# Patient Record
Sex: MALE | Race: BLACK OR AFRICAN AMERICAN | HISPANIC OR LATINO | Employment: FULL TIME | URBAN - METROPOLITAN AREA
[De-identification: names, ages, dates, MRNs, and addresses within clinical notes are randomized per-mention and may not be internally consistent; named-entity substitution may affect disease eponyms.]

---

## 2023-03-23 PROCEDURE — 82962 GLUCOSE BLOOD TEST: CPT

## 2024-10-28 ENCOUNTER — HOSPITAL ENCOUNTER (OUTPATIENT)
Facility: HOSPITAL | Age: 64
Setting detail: OBSERVATION
Discharge: HOME | End: 2024-10-30
Attending: EMERGENCY MEDICINE

## 2024-10-28 ENCOUNTER — APPOINTMENT (OUTPATIENT)
Dept: GENERAL RADIOLOGY | Facility: HOSPITAL | Age: 64
End: 2024-10-28

## 2024-10-28 DIAGNOSIS — R53.83 OTHER FATIGUE: ICD-10-CM

## 2024-10-28 DIAGNOSIS — R73.9 HYPERGLYCEMIA: Primary | ICD-10-CM

## 2024-10-28 PROBLEM — R63.4 WEIGHT LOSS: Status: ACTIVE | Noted: 2024-10-28

## 2024-10-28 PROBLEM — E11.00 HYPEROSMOLAR HYPERGLYCEMIC STATE (HHS): Status: ACTIVE | Noted: 2024-10-28

## 2024-10-28 PROBLEM — R05.1 ACUTE COUGH: Status: ACTIVE | Noted: 2024-10-28

## 2024-10-28 LAB
ACETONE BLD QL: NEGATIVE
ALBUMIN SERPL-MCNC: 3.9 G/DL (ref 3.5–5.2)
ALBUMIN/GLOB SERPL: 1.1 G/DL
ALP SERPL-CCNC: 120 U/L (ref 39–117)
ALT SERPL W P-5'-P-CCNC: 19 U/L (ref 1–41)
ANION GAP SERPL CALCULATED.3IONS-SCNC: 11.2 MMOL/L (ref 5–15)
ANION GAP SERPL CALCULATED.3IONS-SCNC: 11.3 MMOL/L (ref 5–15)
AST SERPL-CCNC: 18 U/L (ref 1–40)
ATMOSPHERIC PRESS: ABNORMAL MM[HG]
BASE EXCESS BLDV CALC-SCNC: 1.7 MMOL/L (ref -2–2)
BASOPHILS # BLD AUTO: 0.03 10*3/MM3 (ref 0–0.2)
BASOPHILS # BLD AUTO: 0.04 10*3/MM3 (ref 0–0.2)
BASOPHILS NFR BLD AUTO: 0.5 % (ref 0–1.5)
BASOPHILS NFR BLD AUTO: 0.7 % (ref 0–1.5)
BDY SITE: ABNORMAL
BILIRUB SERPL-MCNC: 0.8 MG/DL (ref 0–1.2)
BILIRUB UR QL STRIP: NEGATIVE
BUN SERPL-MCNC: 31 MG/DL (ref 8–23)
BUN SERPL-MCNC: 31 MG/DL (ref 8–23)
BUN/CREAT SERPL: 20 (ref 7–25)
BUN/CREAT SERPL: 21.1 (ref 7–25)
CALCIUM SPEC-SCNC: 9.4 MG/DL (ref 8.6–10.5)
CALCIUM SPEC-SCNC: 9.5 MG/DL (ref 8.6–10.5)
CHLORIDE SERPL-SCNC: 86 MMOL/L (ref 98–107)
CHLORIDE SERPL-SCNC: 88 MMOL/L (ref 98–107)
CHOLEST SERPL-MCNC: 191 MG/DL (ref 0–200)
CLARITY UR: CLEAR
CO2 BLDA-SCNC: 28.7 MMOL/L (ref 22–29)
CO2 SERPL-SCNC: 24.7 MMOL/L (ref 22–29)
CO2 SERPL-SCNC: 24.8 MMOL/L (ref 22–29)
COLOR UR: YELLOW
CREAT SERPL-MCNC: 1.47 MG/DL (ref 0.76–1.27)
CREAT SERPL-MCNC: 1.55 MG/DL (ref 0.76–1.27)
DEPRECATED RDW RBC AUTO: 39.3 FL (ref 37–54)
DEPRECATED RDW RBC AUTO: 39.7 FL (ref 37–54)
EGFRCR SERPLBLD CKD-EPI 2021: 50 ML/MIN/1.73
EGFRCR SERPLBLD CKD-EPI 2021: 53.3 ML/MIN/1.73
EOSINOPHIL # BLD AUTO: 0.07 10*3/MM3 (ref 0–0.4)
EOSINOPHIL # BLD AUTO: 0.09 10*3/MM3 (ref 0–0.4)
EOSINOPHIL NFR BLD AUTO: 1.2 % (ref 0.3–6.2)
EOSINOPHIL NFR BLD AUTO: 1.5 % (ref 0.3–6.2)
ERYTHROCYTE [DISTWIDTH] IN BLOOD BY AUTOMATED COUNT: 12.2 % (ref 12.3–15.4)
ERYTHROCYTE [DISTWIDTH] IN BLOOD BY AUTOMATED COUNT: 12.2 % (ref 12.3–15.4)
GEN 5 2HR TROPONIN T REFLEX: 9 NG/L
GLOBULIN UR ELPH-MCNC: 3.4 GM/DL
GLUCOSE BLDC GLUCOMTR-MCNC: 194 MG/DL (ref 70–105)
GLUCOSE BLDC GLUCOMTR-MCNC: 312 MG/DL (ref 70–105)
GLUCOSE BLDC GLUCOMTR-MCNC: 332 MG/DL (ref 70–105)
GLUCOSE BLDC GLUCOMTR-MCNC: 534 MG/DL (ref 70–105)
GLUCOSE BLDC GLUCOMTR-MCNC: >600 MG/DL (ref 70–105)
GLUCOSE SERPL-MCNC: 734 MG/DL (ref 65–99)
GLUCOSE SERPL-MCNC: 785 MG/DL (ref 65–99)
GLUCOSE UR STRIP-MCNC: ABNORMAL MG/DL
HBA1C MFR BLD: 19.3 % (ref 4.8–5.6)
HCO3 BLDV-SCNC: 27.3 MMOL/L (ref 22–26)
HCT VFR BLD AUTO: 40.8 % (ref 37.5–51)
HCT VFR BLD AUTO: 40.9 % (ref 37.5–51)
HDLC SERPL-MCNC: 41 MG/DL (ref 40–60)
HGB BLD-MCNC: 13.9 G/DL (ref 13–17.7)
HGB BLD-MCNC: 14.1 G/DL (ref 13–17.7)
HGB UR QL STRIP.AUTO: NEGATIVE
IMM GRANULOCYTES # BLD AUTO: 0.01 10*3/MM3 (ref 0–0.05)
IMM GRANULOCYTES # BLD AUTO: 0.02 10*3/MM3 (ref 0–0.05)
IMM GRANULOCYTES NFR BLD AUTO: 0.2 % (ref 0–0.5)
IMM GRANULOCYTES NFR BLD AUTO: 0.3 % (ref 0–0.5)
INHALED O2 CONCENTRATION: 21 %
KETONES UR QL STRIP: NEGATIVE
LDLC SERPL CALC-MCNC: 74 MG/DL (ref 0–100)
LDLC/HDLC SERPL: 1.3 {RATIO}
LEUKOCYTE ESTERASE UR QL STRIP.AUTO: NEGATIVE
LIPASE SERPL-CCNC: 47 U/L (ref 13–60)
LYMPHOCYTES # BLD AUTO: 1.12 10*3/MM3 (ref 0.7–3.1)
LYMPHOCYTES # BLD AUTO: 1.25 10*3/MM3 (ref 0.7–3.1)
LYMPHOCYTES NFR BLD AUTO: 19.1 % (ref 19.6–45.3)
LYMPHOCYTES NFR BLD AUTO: 20.8 % (ref 19.6–45.3)
MAGNESIUM SERPL-MCNC: 1.9 MG/DL (ref 1.6–2.4)
MAGNESIUM SERPL-MCNC: 2.1 MG/DL (ref 1.6–2.4)
MCH RBC QN AUTO: 30.3 PG (ref 26.6–33)
MCH RBC QN AUTO: 30.3 PG (ref 26.6–33)
MCHC RBC AUTO-ENTMCNC: 34.1 G/DL (ref 31.5–35.7)
MCHC RBC AUTO-ENTMCNC: 34.5 G/DL (ref 31.5–35.7)
MCV RBC AUTO: 87.8 FL (ref 79–97)
MCV RBC AUTO: 88.9 FL (ref 79–97)
MODALITY: ABNORMAL
MONOCYTES # BLD AUTO: 0.37 10*3/MM3 (ref 0.1–0.9)
MONOCYTES # BLD AUTO: 0.45 10*3/MM3 (ref 0.1–0.9)
MONOCYTES NFR BLD AUTO: 6.3 % (ref 5–12)
MONOCYTES NFR BLD AUTO: 7.5 % (ref 5–12)
NEUTROPHILS NFR BLD AUTO: 4.17 10*3/MM3 (ref 1.7–7)
NEUTROPHILS NFR BLD AUTO: 4.26 10*3/MM3 (ref 1.7–7)
NEUTROPHILS NFR BLD AUTO: 69.4 % (ref 42.7–76)
NEUTROPHILS NFR BLD AUTO: 72.5 % (ref 42.7–76)
NITRITE UR QL STRIP: NEGATIVE
NRBC BLD AUTO-RTO: 0 /100 WBC (ref 0–0.2)
NRBC BLD AUTO-RTO: 0 /100 WBC (ref 0–0.2)
OSMOLALITY SERPL: 315 MOSM/KG (ref 280–301)
OSMOLALITY UR: 558 MOSM/KG (ref 300–800)
PCO2 BLDV: 45 MM HG (ref 42–51)
PH BLDV: 7.39 PH UNITS (ref 7.32–7.43)
PH UR STRIP.AUTO: 5.5 [PH] (ref 5–8)
PHOSPHATE SERPL-MCNC: 3.7 MG/DL (ref 2.5–4.5)
PHOSPHATE SERPL-MCNC: 4 MG/DL (ref 2.5–4.5)
PLATELET # BLD AUTO: 196 10*3/MM3 (ref 140–450)
PLATELET # BLD AUTO: 197 10*3/MM3 (ref 140–450)
PMV BLD AUTO: 10.8 FL (ref 6–12)
PMV BLD AUTO: 11.1 FL (ref 6–12)
PO2 BLDV: 42 MM HG (ref 40–42)
POTASSIUM SERPL-SCNC: 4.9 MMOL/L (ref 3.5–5.2)
POTASSIUM SERPL-SCNC: 5.1 MMOL/L (ref 3.5–5.2)
PROT SERPL-MCNC: 7.3 G/DL (ref 6–8.5)
PROT UR QL STRIP: NEGATIVE
RBC # BLD AUTO: 4.59 10*6/MM3 (ref 4.14–5.8)
RBC # BLD AUTO: 4.66 10*6/MM3 (ref 4.14–5.8)
SAO2 % BLDCOV: 76.6 % (ref 45–75)
SODIUM SERPL-SCNC: 122 MMOL/L (ref 136–145)
SODIUM SERPL-SCNC: 124 MMOL/L (ref 136–145)
SODIUM UR-SCNC: 29 MMOL/L
SP GR UR STRIP: 1.03 (ref 1–1.03)
TRIGL SERPL-MCNC: 484 MG/DL (ref 0–150)
TROPONIN T DELTA: 1 NG/L
TROPONIN T SERPL HS-MCNC: 8 NG/L
TSH SERPL DL<=0.05 MIU/L-ACNC: 1.79 UIU/ML (ref 0.27–4.2)
UROBILINOGEN UR QL STRIP: ABNORMAL
VLDLC SERPL-MCNC: 76 MG/DL (ref 5–40)
WBC NRBC COR # BLD AUTO: 5.87 10*3/MM3 (ref 3.4–10.8)
WBC NRBC COR # BLD AUTO: 6.01 10*3/MM3 (ref 3.4–10.8)

## 2024-10-28 PROCEDURE — G0378 HOSPITAL OBSERVATION PER HR: HCPCS

## 2024-10-28 PROCEDURE — 83930 ASSAY OF BLOOD OSMOLALITY: CPT | Performed by: EMERGENCY MEDICINE

## 2024-10-28 PROCEDURE — 82803 BLOOD GASES ANY COMBINATION: CPT | Performed by: EMERGENCY MEDICINE

## 2024-10-28 PROCEDURE — 96366 THER/PROPH/DIAG IV INF ADDON: CPT

## 2024-10-28 PROCEDURE — 25010000002 SODIUM CHLORIDE 0.9 % WITH KCL 20 MEQ 20-0.9 MEQ/L-% SOLUTION: Performed by: EMERGENCY MEDICINE

## 2024-10-28 PROCEDURE — 96375 TX/PRO/DX INJ NEW DRUG ADDON: CPT

## 2024-10-28 PROCEDURE — 80053 COMPREHEN METABOLIC PANEL: CPT | Performed by: EMERGENCY MEDICINE

## 2024-10-28 PROCEDURE — 93005 ELECTROCARDIOGRAM TRACING: CPT | Performed by: EMERGENCY MEDICINE

## 2024-10-28 PROCEDURE — 83935 ASSAY OF URINE OSMOLALITY: CPT | Performed by: NURSE PRACTITIONER

## 2024-10-28 PROCEDURE — 81003 URINALYSIS AUTO W/O SCOPE: CPT | Performed by: EMERGENCY MEDICINE

## 2024-10-28 PROCEDURE — 99285 EMERGENCY DEPT VISIT HI MDM: CPT

## 2024-10-28 PROCEDURE — 82570 ASSAY OF URINE CREATININE: CPT | Performed by: NURSE PRACTITIONER

## 2024-10-28 PROCEDURE — 25810000003 SODIUM CHLORIDE 0.9 % SOLUTION: Performed by: EMERGENCY MEDICINE

## 2024-10-28 PROCEDURE — 82948 REAGENT STRIP/BLOOD GLUCOSE: CPT

## 2024-10-28 PROCEDURE — 82009 KETONE BODYS QUAL: CPT | Performed by: EMERGENCY MEDICINE

## 2024-10-28 PROCEDURE — 96365 THER/PROPH/DIAG IV INF INIT: CPT

## 2024-10-28 PROCEDURE — 84484 ASSAY OF TROPONIN QUANT: CPT | Performed by: EMERGENCY MEDICINE

## 2024-10-28 PROCEDURE — 85025 COMPLETE CBC W/AUTO DIFF WBC: CPT | Performed by: EMERGENCY MEDICINE

## 2024-10-28 PROCEDURE — 71045 X-RAY EXAM CHEST 1 VIEW: CPT

## 2024-10-28 PROCEDURE — 84300 ASSAY OF URINE SODIUM: CPT | Performed by: NURSE PRACTITIONER

## 2024-10-28 PROCEDURE — 84100 ASSAY OF PHOSPHORUS: CPT | Performed by: EMERGENCY MEDICINE

## 2024-10-28 PROCEDURE — 83690 ASSAY OF LIPASE: CPT | Performed by: EMERGENCY MEDICINE

## 2024-10-28 PROCEDURE — 84540 ASSAY OF URINE/UREA-N: CPT | Performed by: NURSE PRACTITIONER

## 2024-10-28 PROCEDURE — 93005 ELECTROCARDIOGRAM TRACING: CPT

## 2024-10-28 PROCEDURE — 83036 HEMOGLOBIN GLYCOSYLATED A1C: CPT | Performed by: EMERGENCY MEDICINE

## 2024-10-28 PROCEDURE — 84443 ASSAY THYROID STIM HORMONE: CPT | Performed by: EMERGENCY MEDICINE

## 2024-10-28 PROCEDURE — 80061 LIPID PANEL: CPT | Performed by: NURSE PRACTITIONER

## 2024-10-28 PROCEDURE — 83735 ASSAY OF MAGNESIUM: CPT | Performed by: EMERGENCY MEDICINE

## 2024-10-28 RX ORDER — ENOXAPARIN SODIUM 100 MG/ML
40 INJECTION SUBCUTANEOUS
Status: DISCONTINUED | OUTPATIENT
Start: 2024-10-28 | End: 2024-10-30 | Stop reason: HOSPADM

## 2024-10-28 RX ORDER — NICOTINE POLACRILEX 4 MG
15 LOZENGE BUCCAL
Status: DISCONTINUED | OUTPATIENT
Start: 2024-10-28 | End: 2024-10-29 | Stop reason: SDUPTHER

## 2024-10-28 RX ORDER — SODIUM CHLORIDE 9 MG/ML
200 INJECTION, SOLUTION INTRAVENOUS CONTINUOUS PRN
Status: DISCONTINUED | OUTPATIENT
Start: 2024-10-28 | End: 2024-10-29

## 2024-10-28 RX ORDER — NITROGLYCERIN 0.4 MG/1
0.4 TABLET SUBLINGUAL
Status: DISCONTINUED | OUTPATIENT
Start: 2024-10-28 | End: 2024-10-30 | Stop reason: HOSPADM

## 2024-10-28 RX ORDER — SODIUM CHLORIDE AND POTASSIUM CHLORIDE 150; 450 MG/100ML; MG/100ML
200 INJECTION, SOLUTION INTRAVENOUS CONTINUOUS PRN
Status: DISCONTINUED | OUTPATIENT
Start: 2024-10-28 | End: 2024-10-29

## 2024-10-28 RX ORDER — SODIUM CHLORIDE 0.9 % (FLUSH) 0.9 %
10 SYRINGE (ML) INJECTION AS NEEDED
Status: DISCONTINUED | OUTPATIENT
Start: 2024-10-28 | End: 2024-10-30 | Stop reason: HOSPADM

## 2024-10-28 RX ORDER — DEXTROSE MONOHYDRATE AND SODIUM CHLORIDE 5; .45 G/100ML; G/100ML
125 INJECTION, SOLUTION INTRAVENOUS CONTINUOUS PRN
Status: DISCONTINUED | OUTPATIENT
Start: 2024-10-28 | End: 2024-10-29

## 2024-10-28 RX ORDER — BISACODYL 5 MG/1
5 TABLET, DELAYED RELEASE ORAL DAILY PRN
Status: DISCONTINUED | OUTPATIENT
Start: 2024-10-28 | End: 2024-10-30 | Stop reason: HOSPADM

## 2024-10-28 RX ORDER — POLYETHYLENE GLYCOL 3350 17 G/17G
17 POWDER, FOR SOLUTION ORAL DAILY PRN
Status: DISCONTINUED | OUTPATIENT
Start: 2024-10-28 | End: 2024-10-30 | Stop reason: HOSPADM

## 2024-10-28 RX ORDER — SODIUM CHLORIDE AND POTASSIUM CHLORIDE 150; 900 MG/100ML; MG/100ML
200 INJECTION, SOLUTION INTRAVENOUS CONTINUOUS PRN
Status: DISCONTINUED | OUTPATIENT
Start: 2024-10-28 | End: 2024-10-29

## 2024-10-28 RX ORDER — ACETAMINOPHEN 325 MG/1
650 TABLET ORAL EVERY 4 HOURS PRN
Status: DISCONTINUED | OUTPATIENT
Start: 2024-10-28 | End: 2024-10-30 | Stop reason: HOSPADM

## 2024-10-28 RX ORDER — DEXTROSE MONOHYDRATE AND SODIUM CHLORIDE 5; .9 G/100ML; G/100ML
125 INJECTION, SOLUTION INTRAVENOUS CONTINUOUS PRN
Status: DISCONTINUED | OUTPATIENT
Start: 2024-10-28 | End: 2024-10-29

## 2024-10-28 RX ORDER — DEXTROSE MONOHYDRATE, SODIUM CHLORIDE, AND POTASSIUM CHLORIDE 50; 1.49; 4.5 G/1000ML; G/1000ML; G/1000ML
125 INJECTION, SOLUTION INTRAVENOUS CONTINUOUS PRN
Status: DISCONTINUED | OUTPATIENT
Start: 2024-10-28 | End: 2024-10-29

## 2024-10-28 RX ORDER — SODIUM CHLORIDE 0.9 % (FLUSH) 0.9 %
10 SYRINGE (ML) INJECTION EVERY 12 HOURS SCHEDULED
Status: DISCONTINUED | OUTPATIENT
Start: 2024-10-28 | End: 2024-10-30 | Stop reason: HOSPADM

## 2024-10-28 RX ORDER — SODIUM CHLORIDE 9 MG/ML
40 INJECTION, SOLUTION INTRAVENOUS ONCE
Status: COMPLETED | OUTPATIENT
Start: 2024-10-28 | End: 2024-10-28

## 2024-10-28 RX ORDER — IBUPROFEN 600 MG/1
1 TABLET ORAL
Status: DISCONTINUED | OUTPATIENT
Start: 2024-10-28 | End: 2024-10-30 | Stop reason: HOSPADM

## 2024-10-28 RX ORDER — DEXTROSE MONOHYDRATE 25 G/50ML
10-50 INJECTION, SOLUTION INTRAVENOUS
Status: DISCONTINUED | OUTPATIENT
Start: 2024-10-28 | End: 2024-10-29 | Stop reason: SDUPTHER

## 2024-10-28 RX ORDER — DEXTROSE MONOHYDRATE, SODIUM CHLORIDE, AND POTASSIUM CHLORIDE 50; 2.98; 4.5 G/1000ML; G/1000ML; G/1000ML
125 INJECTION, SOLUTION INTRAVENOUS CONTINUOUS PRN
Status: DISCONTINUED | OUTPATIENT
Start: 2024-10-28 | End: 2024-10-29

## 2024-10-28 RX ORDER — UBIDECARENONE 100 MG
100 CAPSULE ORAL DAILY
COMMUNITY

## 2024-10-28 RX ORDER — ONDANSETRON 2 MG/ML
4 INJECTION INTRAMUSCULAR; INTRAVENOUS EVERY 6 HOURS PRN
Status: DISCONTINUED | OUTPATIENT
Start: 2024-10-28 | End: 2024-10-30 | Stop reason: HOSPADM

## 2024-10-28 RX ORDER — SODIUM CHLORIDE 450 MG/100ML
200 INJECTION, SOLUTION INTRAVENOUS CONTINUOUS PRN
Status: DISCONTINUED | OUTPATIENT
Start: 2024-10-28 | End: 2024-10-29

## 2024-10-28 RX ORDER — MULTIPLE VITAMINS W/ MINERALS TAB 9MG-400MCG
1 TAB ORAL DAILY
COMMUNITY

## 2024-10-28 RX ORDER — DEXTROSE MONOHYDRATE, SODIUM CHLORIDE, AND POTASSIUM CHLORIDE 50; 1.49; 9 G/1000ML; G/1000ML; G/1000ML
125 INJECTION, SOLUTION INTRAVENOUS CONTINUOUS PRN
Status: DISCONTINUED | OUTPATIENT
Start: 2024-10-28 | End: 2024-10-29

## 2024-10-28 RX ORDER — GUAIFENESIN 600 MG/1
600 TABLET, EXTENDED RELEASE ORAL EVERY 12 HOURS SCHEDULED
Status: DISCONTINUED | OUTPATIENT
Start: 2024-10-28 | End: 2024-10-30 | Stop reason: HOSPADM

## 2024-10-28 RX ORDER — PANTOPRAZOLE SODIUM 40 MG/10ML
40 INJECTION, POWDER, LYOPHILIZED, FOR SOLUTION INTRAVENOUS
Status: DISCONTINUED | OUTPATIENT
Start: 2024-10-28 | End: 2024-10-29

## 2024-10-28 RX ORDER — BISACODYL 10 MG
10 SUPPOSITORY, RECTAL RECTAL DAILY PRN
Status: DISCONTINUED | OUTPATIENT
Start: 2024-10-28 | End: 2024-10-30 | Stop reason: HOSPADM

## 2024-10-28 RX ORDER — SODIUM CHLORIDE AND POTASSIUM CHLORIDE 300; 900 MG/100ML; MG/100ML
200 INJECTION, SOLUTION INTRAVENOUS CONTINUOUS PRN
Status: DISCONTINUED | OUTPATIENT
Start: 2024-10-28 | End: 2024-10-29

## 2024-10-28 RX ORDER — AMOXICILLIN 250 MG
2 CAPSULE ORAL 2 TIMES DAILY PRN
Status: DISCONTINUED | OUTPATIENT
Start: 2024-10-28 | End: 2024-10-30 | Stop reason: HOSPADM

## 2024-10-28 RX ORDER — DEXTROSE MONOHYDRATE, SODIUM CHLORIDE, AND POTASSIUM CHLORIDE 50; 2.98; 9 G/1000ML; G/1000ML; G/1000ML
125 INJECTION, SOLUTION INTRAVENOUS CONTINUOUS PRN
Status: DISCONTINUED | OUTPATIENT
Start: 2024-10-28 | End: 2024-10-29

## 2024-10-28 RX ADMIN — SODIUM CHLORIDE 1000 ML/HR: 9 INJECTION, SOLUTION INTRAVENOUS at 20:13

## 2024-10-28 RX ADMIN — Medication 10 ML: at 21:17

## 2024-10-28 RX ADMIN — PANTOPRAZOLE SODIUM 40 MG: 40 INJECTION, POWDER, FOR SOLUTION INTRAVENOUS at 21:42

## 2024-10-28 RX ADMIN — GUAIFENESIN 600 MG: 600 TABLET, EXTENDED RELEASE ORAL at 22:39

## 2024-10-28 RX ADMIN — INSULIN HUMAN 10.8 UNITS/HR: 1 INJECTION, SOLUTION INTRAVENOUS at 20:08

## 2024-10-28 RX ADMIN — SODIUM CHLORIDE 40 ML: 9 INJECTION, SOLUTION INTRAVENOUS at 22:06

## 2024-10-28 RX ADMIN — SODIUM CHLORIDE AND POTASSIUM CHLORIDE 200 ML/HR: .9; .15 SOLUTION INTRAVENOUS at 22:21

## 2024-10-28 RX ADMIN — SODIUM CHLORIDE 1000 ML: 9 INJECTION, SOLUTION INTRAVENOUS at 20:05

## 2024-10-28 NOTE — ED PROVIDER NOTES
"Subjective   History of Present Illness  63-year-old male states he went to the urgent care center today because he is been feeling somewhat weak and fatigued over the last couple of months.  He states he is also had some weight loss.  He states that they checked his blood sugar and it was very high.  He has no history of diabetes or other health problems.  He states diabetes does run in the family.  He reports no vomiting or diarrhea.  He states he has had some increased urinary frequency without dysuria and has had increased thirst.  Review of Systems    Past Medical History:   Diagnosis Date    Acute cough 10/28/2024    Weight loss 10/28/2024       No Known Allergies    No past surgical history on file.    No family history on file.    Social History     Socioeconomic History    Marital status: Single   Tobacco Use    Smoking status: Never    Smokeless tobacco: Never   Vaping Use    Vaping status: Never Used   Substance and Sexual Activity    Alcohol use: Yes     Comment: rarely    Drug use: Never    Sexual activity: Defer     Prior to Admission medications    Medication Sig Start Date End Date Taking? Authorizing Provider   doxycycline (VIBRAMYCIN) 100 MG capsule Take 1 capsule by mouth 2 (Two) Times a Day. 3/23/23 10/28/24  Han Solis MD     /81   Pulse 99   Temp 98 °F (36.7 °C) (Oral)   Resp 19   Ht 190.5 cm (75\")   Wt 83.9 kg (185 lb)   SpO2 96%   BMI 23.12 kg/m²         Objective   Physical Exam  General: Well-developed well-appearing, no acute distress, alert and appropriate  Eyes:  sclera nonicteric  HEENT: Mucous membranes somewhat dry, no mucosal swelling  Neck: Supple, no nuchal rigidity, no lymphadenopathy  Respirations: Respirations nonlabored, equal breath sounds bilaterally, clear lungs  Heart regular rate and rhythm, no murmurs rubs or gallops,   Abdomen soft nontender nondistended, no hepatosplenomegaly, no hernia, no mass, normal bowel sounds, no CVA " tenderness  Extremities no clubbing cyanosis or edema, calves are symmetric and nontender  Neuro cranial nerves grossly intact, no focal limb deficits  Psych oriented, pleasant affect  Skin no rash, brisk cap refill  Procedures           ED Course  Results for orders placed or performed during the hospital encounter of 10/28/24   ECG 12 Lead Dyspnea    Collection Time: 10/28/24  5:41 PM   Result Value Ref Range    QT Interval 345 ms    QTC Interval 434 ms   Comprehensive Metabolic Panel    Collection Time: 10/28/24  6:12 PM    Specimen: Blood   Result Value Ref Range    Glucose 785 (C) 65 - 99 mg/dL    BUN 31 (H) 8 - 23 mg/dL    Creatinine 1.55 (H) 0.76 - 1.27 mg/dL    Sodium 122 (L) 136 - 145 mmol/L    Potassium 5.1 3.5 - 5.2 mmol/L    Chloride 86 (L) 98 - 107 mmol/L    CO2 24.7 22.0 - 29.0 mmol/L    Calcium 9.5 8.6 - 10.5 mg/dL    Total Protein 7.3 6.0 - 8.5 g/dL    Albumin 3.9 3.5 - 5.2 g/dL    ALT (SGPT) 19 1 - 41 U/L    AST (SGOT) 18 1 - 40 U/L    Alkaline Phosphatase 120 (H) 39 - 117 U/L    Total Bilirubin 0.8 0.0 - 1.2 mg/dL    Globulin 3.4 gm/dL    A/G Ratio 1.1 g/dL    BUN/Creatinine Ratio 20.0 7.0 - 25.0    Anion Gap 11.3 5.0 - 15.0 mmol/L    eGFR 50.0 (L) >60.0 mL/min/1.73   Lipase    Collection Time: 10/28/24  6:12 PM    Specimen: Blood   Result Value Ref Range    Lipase 47 13 - 60 U/L   Urinalysis With Microscopic If Indicated (No Culture) - Urine, Clean Catch    Collection Time: 10/28/24  6:12 PM    Specimen: Urine, Clean Catch   Result Value Ref Range    Color, UA Yellow Yellow, Straw    Appearance, UA Clear Clear    pH, UA 5.5 5.0 - 8.0    Specific Gravity, UA 1.029 1.005 - 1.030    Glucose,  mg/dL (2+) (A) Negative    Ketones, UA Negative Negative    Bilirubin, UA Negative Negative    Blood, UA Negative Negative    Protein, UA Negative Negative    Leuk Esterase, UA Negative Negative    Nitrite, UA Negative Negative    Urobilinogen, UA 0.2 E.U./dL 0.2 - 1.0 E.U./dL   TSH    Collection Time:  10/28/24  6:12 PM    Specimen: Blood   Result Value Ref Range    TSH 1.790 0.270 - 4.200 uIU/mL   Magnesium    Collection Time: 10/28/24  6:12 PM    Specimen: Blood   Result Value Ref Range    Magnesium 1.9 1.6 - 2.4 mg/dL   CBC Auto Differential    Collection Time: 10/28/24  6:12 PM    Specimen: Blood   Result Value Ref Range    WBC 5.87 3.40 - 10.80 10*3/mm3    RBC 4.59 4.14 - 5.80 10*6/mm3    Hemoglobin 13.9 13.0 - 17.7 g/dL    Hematocrit 40.8 37.5 - 51.0 %    MCV 88.9 79.0 - 97.0 fL    MCH 30.3 26.6 - 33.0 pg    MCHC 34.1 31.5 - 35.7 g/dL    RDW 12.2 (L) 12.3 - 15.4 %    RDW-SD 39.7 37.0 - 54.0 fl    MPV 11.1 6.0 - 12.0 fL    Platelets 196 140 - 450 10*3/mm3    Neutrophil % 72.5 42.7 - 76.0 %    Lymphocyte % 19.1 (L) 19.6 - 45.3 %    Monocyte % 6.3 5.0 - 12.0 %    Eosinophil % 1.2 0.3 - 6.2 %    Basophil % 0.7 0.0 - 1.5 %    Immature Grans % 0.2 0.0 - 0.5 %    Neutrophils, Absolute 4.26 1.70 - 7.00 10*3/mm3    Lymphocytes, Absolute 1.12 0.70 - 3.10 10*3/mm3    Monocytes, Absolute 0.37 0.10 - 0.90 10*3/mm3    Eosinophils, Absolute 0.07 0.00 - 0.40 10*3/mm3    Basophils, Absolute 0.04 0.00 - 0.20 10*3/mm3    Immature Grans, Absolute 0.01 0.00 - 0.05 10*3/mm3    nRBC 0.0 0.0 - 0.2 /100 WBC   Acetone    Collection Time: 10/28/24  6:12 PM    Specimen: Blood   Result Value Ref Range    Acetone Negative Negative   Phosphorus    Collection Time: 10/28/24  6:12 PM    Specimen: Blood   Result Value Ref Range    Phosphorus 4.0 2.5 - 4.5 mg/dL   High Sensitivity Troponin T    Collection Time: 10/28/24  6:12 PM    Specimen: Blood   Result Value Ref Range    HS Troponin T 8 <22 ng/L   Blood Gas, Venous -    Collection Time: 10/28/24  7:06 PM    Specimen: Venous Blood   Result Value Ref Range    Site Left Radial     pH, Venous 7.391 7.320 - 7.430 pH Units    pCO2, Venous 45.0 42.0 - 51.0 mm Hg    pO2, Venous 42.0 40.0 - 42.0 mm Hg    HCO3, Venous 27.3 (H) 22.0 - 26.0 mmol/L    Base Excess, Venous 1.7 -2.0 - 2.0 mmol/L    O2  Saturation, Venous 76.6 (H) 45.0 - 75.0 %    CO2 Content 28.7 22 - 29 mmol/L    Barometric Pressure for Blood Gas      Modality Room Air     FIO2 21 %     XR Chest 1 View    Result Date: 10/28/2024  Impression: No acute cardiopulmonary findings. Electronically Signed: Jaden Rooney MD  10/28/2024 6:45 PM EDT  Workstation ID: [id number]   ED Course as of 10/28/24 1949   Mon Oct 28, 2024   1945 Case and findings discussed with Sheryl with the hospitalist service who is agreeable to plan of admission [SH]      ED Course User Index  [SH] Abraham French MD                                               Medical Decision Making  Patient was advised of findings.  He has severe hyperglycemia without apparent DKA he does appear significantly dehydrated likely related to the hyperglycemia..  He had a normal mental status during emergency room course.  He was ordered IV fluids as well as insulin drip. He has no sign of infection.  Patient was agreeable plan of admission.     Problems Addressed:  Hyperglycemia: complicated acute illness or injury  Other fatigue: complicated acute illness or injury    Amount and/or Complexity of Data Reviewed  Labs: ordered. Decision-making details documented in ED Course.     Details: Comprehensive metabolic panel shows severe hyperglycemia without significant acidosis, venous blood gas shows normal pH, high-sensitivity troponin normal, CBC essentially normal  Radiology: ordered and independent interpretation performed.     Details: My independent interpretation of chest x-ray image no apparent acute abnormality  ECG/medicine tests: ordered and independent interpretation performed.     Details: My EKG interpretation sinus rhythm rate of 95, no acute ST or T wave abnormality    Risk  OTC drugs.  Prescription drug management.  Decision regarding hospitalization.        Final diagnoses:   Hyperglycemia   Other fatigue       ED Disposition  ED Disposition       ED Disposition   Decision to Admit     Condition   --    Comment   Level of Care: Telemetry [5]   Admitting Physician: LLANES ALVAREZ, CARLOS [[id number]]   Attending Physician: LLANES ALVAREZ, CARLOS [[id number]]                 No follow-up provider specified.       Medication List      No changes were made to your prescriptions during this visit.            Abraham French MD  10/28/24 1949       Abraham French MD  10/28/24 1950

## 2024-10-28 NOTE — Clinical Note
Level of Care: Telemetry [5]   Admitting Physician: LLANES ALVAREZ, CARLOS [[id number]]   Attending Physician: LLANES ALVAREZ, CARLOS [[id number]]

## 2024-10-28 NOTE — ED NOTES
"Patient said he was seen at [facility] [place] today and told his blood sugar read \"high\". Pt states he has been feeling weak and had some dizziness off and on.  "

## 2024-10-29 PROBLEM — E11.00 HYPEROSMOLAR HYPERGLYCEMIC STATE (HHS): Status: RESOLVED | Noted: 2024-10-28 | Resolved: 2024-10-29

## 2024-10-29 LAB
ANION GAP SERPL CALCULATED.3IONS-SCNC: 9.7 MMOL/L (ref 5–15)
BASOPHILS # BLD AUTO: 0.05 10*3/MM3 (ref 0–0.2)
BASOPHILS NFR BLD AUTO: 0.8 % (ref 0–1.5)
BUN SERPL-MCNC: 25 MG/DL (ref 8–23)
BUN/CREAT SERPL: 22.3 (ref 7–25)
CALCIUM SPEC-SCNC: 8.9 MG/DL (ref 8.6–10.5)
CHLORIDE SERPL-SCNC: 102 MMOL/L (ref 98–107)
CO2 SERPL-SCNC: 23.3 MMOL/L (ref 22–29)
CREAT SERPL-MCNC: 1.12 MG/DL (ref 0.76–1.27)
CREAT UR-MCNC: 18.5 MG/DL
DEPRECATED RDW RBC AUTO: 38.6 FL (ref 37–54)
EGFRCR SERPLBLD CKD-EPI 2021: 73.8 ML/MIN/1.73
EOSINOPHIL # BLD AUTO: 0.14 10*3/MM3 (ref 0–0.4)
EOSINOPHIL NFR BLD AUTO: 2.1 % (ref 0.3–6.2)
ERYTHROCYTE [DISTWIDTH] IN BLOOD BY AUTOMATED COUNT: 12 % (ref 12.3–15.4)
GLUCOSE BLDC GLUCOMTR-MCNC: 126 MG/DL (ref 70–105)
GLUCOSE BLDC GLUCOMTR-MCNC: 130 MG/DL (ref 70–105)
GLUCOSE BLDC GLUCOMTR-MCNC: 153 MG/DL (ref 70–105)
GLUCOSE BLDC GLUCOMTR-MCNC: 169 MG/DL (ref 70–105)
GLUCOSE BLDC GLUCOMTR-MCNC: 173 MG/DL (ref 70–105)
GLUCOSE BLDC GLUCOMTR-MCNC: 218 MG/DL (ref 70–105)
GLUCOSE BLDC GLUCOMTR-MCNC: 324 MG/DL (ref 70–105)
GLUCOSE BLDC GLUCOMTR-MCNC: 366 MG/DL (ref 70–105)
GLUCOSE BLDC GLUCOMTR-MCNC: 380 MG/DL (ref 70–105)
GLUCOSE SERPL-MCNC: 142 MG/DL (ref 65–99)
HCT VFR BLD AUTO: 38 % (ref 37.5–51)
HGB BLD-MCNC: 12.9 G/DL (ref 13–17.7)
IMM GRANULOCYTES # BLD AUTO: 0.02 10*3/MM3 (ref 0–0.05)
IMM GRANULOCYTES NFR BLD AUTO: 0.3 % (ref 0–0.5)
LYMPHOCYTES # BLD AUTO: 2.02 10*3/MM3 (ref 0.7–3.1)
LYMPHOCYTES NFR BLD AUTO: 30.9 % (ref 19.6–45.3)
MAGNESIUM SERPL-MCNC: 1.9 MG/DL (ref 1.6–2.4)
MCH RBC QN AUTO: 30.1 PG (ref 26.6–33)
MCHC RBC AUTO-ENTMCNC: 33.9 G/DL (ref 31.5–35.7)
MCV RBC AUTO: 88.6 FL (ref 79–97)
MONOCYTES # BLD AUTO: 0.52 10*3/MM3 (ref 0.1–0.9)
MONOCYTES NFR BLD AUTO: 8 % (ref 5–12)
NEUTROPHILS NFR BLD AUTO: 3.79 10*3/MM3 (ref 1.7–7)
NEUTROPHILS NFR BLD AUTO: 57.9 % (ref 42.7–76)
NRBC BLD AUTO-RTO: 0 /100 WBC (ref 0–0.2)
PHOSPHATE SERPL-MCNC: 3.1 MG/DL (ref 2.5–4.5)
PLATELET # BLD AUTO: 184 10*3/MM3 (ref 140–450)
PMV BLD AUTO: 10.7 FL (ref 6–12)
POTASSIUM SERPL-SCNC: 4 MMOL/L (ref 3.5–5.2)
QT INTERVAL: 345 MS
QTC INTERVAL: 434 MS
RBC # BLD AUTO: 4.29 10*6/MM3 (ref 4.14–5.8)
SODIUM SERPL-SCNC: 135 MMOL/L (ref 136–145)
UUN 24H UR-MCNC: 204 MG/DL
WBC NRBC COR # BLD AUTO: 6.54 10*3/MM3 (ref 3.4–10.8)

## 2024-10-29 PROCEDURE — G0378 HOSPITAL OBSERVATION PER HR: HCPCS

## 2024-10-29 PROCEDURE — 82948 REAGENT STRIP/BLOOD GLUCOSE: CPT

## 2024-10-29 PROCEDURE — 83735 ASSAY OF MAGNESIUM: CPT | Performed by: EMERGENCY MEDICINE

## 2024-10-29 PROCEDURE — 96366 THER/PROPH/DIAG IV INF ADDON: CPT

## 2024-10-29 PROCEDURE — 82948 REAGENT STRIP/BLOOD GLUCOSE: CPT | Performed by: NURSE PRACTITIONER

## 2024-10-29 PROCEDURE — 85025 COMPLETE CBC W/AUTO DIFF WBC: CPT | Performed by: NURSE PRACTITIONER

## 2024-10-29 PROCEDURE — 63710000001 INSULIN LISPRO (HUMAN) PER 5 UNITS

## 2024-10-29 PROCEDURE — 63710000001 INSULIN LISPRO (HUMAN) PER 5 UNITS: Performed by: NURSE PRACTITIONER

## 2024-10-29 PROCEDURE — 80048 BASIC METABOLIC PNL TOTAL CA: CPT | Performed by: EMERGENCY MEDICINE

## 2024-10-29 PROCEDURE — 63710000001 INSULIN GLARGINE PER 5 UNITS: Performed by: NURSE PRACTITIONER

## 2024-10-29 PROCEDURE — 84100 ASSAY OF PHOSPHORUS: CPT | Performed by: EMERGENCY MEDICINE

## 2024-10-29 PROCEDURE — 25810000003 LACTATED RINGERS SOLUTION

## 2024-10-29 PROCEDURE — G0108 DIAB MANAGE TRN  PER INDIV: HCPCS

## 2024-10-29 RX ORDER — NICOTINE POLACRILEX 4 MG
15 LOZENGE BUCCAL
Status: DISCONTINUED | OUTPATIENT
Start: 2024-10-29 | End: 2024-10-29

## 2024-10-29 RX ORDER — DEXTROSE MONOHYDRATE 25 G/50ML
25 INJECTION, SOLUTION INTRAVENOUS
Status: DISCONTINUED | OUTPATIENT
Start: 2024-10-29 | End: 2024-10-30 | Stop reason: HOSPADM

## 2024-10-29 RX ORDER — PANTOPRAZOLE SODIUM 40 MG/1
40 TABLET, DELAYED RELEASE ORAL
Status: DISCONTINUED | OUTPATIENT
Start: 2024-10-29 | End: 2024-10-30 | Stop reason: HOSPADM

## 2024-10-29 RX ORDER — IBUPROFEN 600 MG/1
1 TABLET ORAL
Status: DISCONTINUED | OUTPATIENT
Start: 2024-10-29 | End: 2024-10-30 | Stop reason: HOSPADM

## 2024-10-29 RX ORDER — INSULIN LISPRO 100 [IU]/ML
5 INJECTION, SOLUTION INTRAVENOUS; SUBCUTANEOUS
Status: DISCONTINUED | OUTPATIENT
Start: 2024-10-29 | End: 2024-10-30 | Stop reason: HOSPADM

## 2024-10-29 RX ORDER — DEXTROSE MONOHYDRATE 25 G/50ML
25 INJECTION, SOLUTION INTRAVENOUS
Status: DISCONTINUED | OUTPATIENT
Start: 2024-10-29 | End: 2024-10-29

## 2024-10-29 RX ORDER — HUMAN INSULIN 100 [IU]/ML
INJECTION, SUSPENSION SUBCUTANEOUS
Qty: 6 ML | Refills: 0 | Status: SHIPPED | OUTPATIENT
Start: 2024-10-29

## 2024-10-29 RX ORDER — INSULIN LISPRO 100 [IU]/ML
2-9 INJECTION, SOLUTION INTRAVENOUS; SUBCUTANEOUS
Status: DISCONTINUED | OUTPATIENT
Start: 2024-10-29 | End: 2024-10-30 | Stop reason: HOSPADM

## 2024-10-29 RX ORDER — PEN NEEDLE, DIABETIC 32GX 5/32"
1 NEEDLE, DISPOSABLE MISCELLANEOUS
Qty: 100 EACH | Refills: 2 | Status: SHIPPED | OUTPATIENT
Start: 2024-10-29

## 2024-10-29 RX ORDER — NICOTINE POLACRILEX 4 MG
15 LOZENGE BUCCAL
Status: DISCONTINUED | OUTPATIENT
Start: 2024-10-29 | End: 2024-10-30 | Stop reason: HOSPADM

## 2024-10-29 RX ORDER — INSULIN LISPRO 100 [IU]/ML
2-9 INJECTION, SOLUTION INTRAVENOUS; SUBCUTANEOUS
Qty: 10.8 ML | Refills: 0 | Status: SHIPPED | OUTPATIENT
Start: 2024-10-29 | End: 2024-10-29 | Stop reason: HOSPADM

## 2024-10-29 RX ADMIN — POTASSIUM CHLORIDE, DEXTROSE MONOHYDRATE AND SODIUM CHLORIDE 125 ML/HR: 150; 5; 450 INJECTION, SOLUTION INTRAVENOUS at 01:40

## 2024-10-29 RX ADMIN — INSULIN LISPRO 4 UNITS: 100 INJECTION, SOLUTION INTRAVENOUS; SUBCUTANEOUS at 22:36

## 2024-10-29 RX ADMIN — SODIUM CHLORIDE, POTASSIUM CHLORIDE, SODIUM LACTATE AND CALCIUM CHLORIDE 1000 ML: 600; 310; 30; 20 INJECTION, SOLUTION INTRAVENOUS at 11:06

## 2024-10-29 RX ADMIN — INSULIN GLARGINE 15 UNITS: 100 INJECTION, SOLUTION SUBCUTANEOUS at 02:00

## 2024-10-29 RX ADMIN — Medication 10 ML: at 08:18

## 2024-10-29 RX ADMIN — INSULIN LISPRO 8 UNITS: 100 INJECTION, SOLUTION INTRAVENOUS; SUBCUTANEOUS at 11:27

## 2024-10-29 RX ADMIN — INSULIN LISPRO 2 UNITS: 100 INJECTION, SOLUTION INTRAVENOUS; SUBCUTANEOUS at 08:15

## 2024-10-29 RX ADMIN — INSULIN LISPRO 5 UNITS: 100 INJECTION, SOLUTION INTRAVENOUS; SUBCUTANEOUS at 19:47

## 2024-10-29 RX ADMIN — GUAIFENESIN 600 MG: 600 TABLET, EXTENDED RELEASE ORAL at 22:36

## 2024-10-29 RX ADMIN — INSULIN LISPRO 8 UNITS: 100 INJECTION, SOLUTION INTRAVENOUS; SUBCUTANEOUS at 16:58

## 2024-10-29 RX ADMIN — Medication 10 ML: at 22:36

## 2024-10-29 RX ADMIN — GUAIFENESIN 600 MG: 600 TABLET, EXTENDED RELEASE ORAL at 08:16

## 2024-10-29 RX ADMIN — INSULIN GLARGINE 15 UNITS: 100 INJECTION, SOLUTION SUBCUTANEOUS at 22:36

## 2024-10-29 NOTE — PLAN OF CARE
Problem: Adult Inpatient Plan of Care  Goal: Absence of Hospital-Acquired Illness or Injury  Intervention: Identify and Manage Fall Risk  Recent Flowsheet Documentation  Taken 10/28/2024 2235 by Cedrick Chong RN  Safety Promotion/Fall Prevention:   room organization consistent   safety round/check completed   nonskid shoes/slippers when out of bed   fall prevention program maintained   clutter free environment maintained   assistive device/personal items within reach   activity supervised  Taken 10/28/2024 2146 by Cedrick Chong RN  Safety Promotion/Fall Prevention:   room organization consistent   safety round/check completed   nonskid shoes/slippers when out of bed   fall prevention program maintained   clutter free environment maintained   assistive device/personal items within reach   activity supervised  Intervention: Prevent Skin Injury  Recent Flowsheet Documentation  Taken 10/28/2024 2146 by Cedrick Chong RN  Body Position: supine  Intervention: Prevent Infection  Recent Flowsheet Documentation  Taken 10/28/2024 2235 by Cedrick Chong RN  Infection Prevention:   single patient room provided   rest/sleep promoted   hand hygiene promoted   environmental surveillance performed  Taken 10/28/2024 2146 by Cedrick Chong RN  Infection Prevention:   single patient room provided   rest/sleep promoted   hand hygiene promoted   environmental surveillance performed   Goal Outcome Evaluation:

## 2024-10-29 NOTE — PLAN OF CARE
Patient is up ad christal, no concerns with discharge or mobility. Patient has no skilled rehab needs and should be safe to discharge home. Will complete order.

## 2024-10-29 NOTE — NURSING NOTE
Pt's discharge cancelled, blood sugars in the 300's. Sliding scale insulin given. Pt up ad christal, able to make needs known. Dr Holder would like BS to be below 180 before discharging. Plan of care ongoing.

## 2024-10-29 NOTE — DISCHARGE SUMMARY
".             [facility]  Discharge Summary    Date of Service: 10/30/2020  Patient Name: Jaret Andrade  : 1960  MRN: [id number]    Date of Admission: 10/28/2024  Discharge Diagnosis: Hyperosmolar hyperglycemic state (HHS)  Date of Discharge: 10/30/2024  Primary Care Physician: Provider, No Known      Presenting Problem:   Hyperglycemia [R73.9]  Other fatigue [R53.83]  Hyperosmolar hyperglycemic state (HHS) [E11.00]    Active and Resolved Hospital Problems:  Active Hospital Problems   No active problems to display.      Resolved Hospital Problems    Diagnosis POA    **Hyperosmolar hyperglycemic state (HHS) [E11.00] Yes         Hospital Course     HPI:    \"Jaret Andrade is a 63 y.o. male with a CMH of untreated DM type 2 who presented to [facility] on 10/28/2024 with weakness, dizziness. Lives at home, independent with ADLs. Denies hx of DM but medical records indicate was seen in  in 2023 diagnosing with DM and encouraged to follow up with PCP or endocrinology.   Presented to  today for intermittent weakness, dizziness for past week. Endorses \"some weight loss.\" Glucose level at  \"high\" was referred to ED for evaluation. Denies N/V/D, fever, chills, recent illness. Endorses polydipsia and polyuria. On arrival to ED VS; 98.7-522-/81-97% room air. Blood work reveals glucose 785, initiated on insulin infusion. \"    Hospital Course:  Patient seen and examined this morning, he is resting comfortably and tolerating his diet, denies any further symptoms.  Detailed discussion carried out with patient about new diagnosis of diabetes and importance of insulin as well as follow-up with her PCP.  He is agreeable to treatment but plans to eventually wean himself off of all medications.  Explained to him severity of uncontrolled diabetes given his A1c of 19.  He verbalized good understanding.  Also encouraged to undergo age-appropriate screening with PCP.  Diabetes " educator to go over insulin teaching with patient.  He is very eager to go home, all questions and concerns addressed.        DISCHARGE Follow Up Recommendations for labs and diagnostics: PCP within 1 week        Day of Discharge     Vital Signs:  Temp:  [96.9 °F (36.1 °C)-98.1 °F (36.7 °C)] 98 °F (36.7 °C)  Heart Rate:  [] 78  Resp:  [14-20] 20  BP: ()/(63-97) 95/63    Physical Exam:  Physical Exam   Constitutional:       Appearance: Normal appearance.   HENT:      Head: Normocephalic and atraumatic.      Mouth/Throat:      Mouth: Mucous membranes are moist.  Eyes:      General: No scleral icterus.     Extraocular Movements: Extraocular movements intact.      Pupils: Pupils are equal, round, and reactive to light.   Cardiovascular:      Rate and Rhythm: Normal rate and regular rhythm.      Pulses: Normal pulses.      Heart sounds: Normal heart sounds.   Pulmonary:      Effort: Pulmonary effort is normal.      Breath sounds: Normal breath sounds.   Abdominal:      General: Bowel sounds are normal. There is no distension.      Palpations: Abdomen is soft.      Tenderness: There is no abdominal tenderness.   Musculoskeletal:      Right lower leg: No edema.      Left lower leg: No edema.   Skin:     General: Skin is warm and dry.   Neurological:      Mental Status: He is alert and oriented to person, place, and time. Mental status is at baseline.      Motor: wnl      Pertinent  and/or Most Recent Results     LAB RESULTS:      Lab 10/29/24  0046 10/28/24  2005 10/28/24  1812   WBC 6.54 6.01 5.87   HEMOGLOBIN 12.9* 14.1 13.9   HEMATOCRIT 38.0 40.9 40.8   PLATELETS 184 197 196   NEUTROS ABS 3.79 4.17 4.26   IMMATURE GRANS (ABS) 0.02 0.02 0.01   LYMPHS ABS 2.02 1.25 1.12   MONOS ABS 0.52 0.45 0.37   EOS ABS 0.14 0.09 0.07   MCV 88.6 87.8 88.9         Lab 10/29/24  0046 10/28/24  2005 10/28/24  1812   SODIUM 135* 124* 122*   POTASSIUM 4.0 4.9 5.1   CHLORIDE 102 88* 86*   CO2 23.3 24.8 24.7   ANION GAP 9.7 11.2  11.3   BUN 25* 31* 31*   CREATININE 1.12 1.47* 1.55*   EGFR 73.8 53.3* 50.0*   GLUCOSE 142* 734* 785*   CALCIUM 8.9 9.4 9.5   MAGNESIUM 1.9 2.1 1.9   PHOSPHORUS 3.1 3.7 4.0   HEMOGLOBIN A1C  --   --  19.30*   TSH  --   --  1.790         Lab 10/28/24  1812   TOTAL PROTEIN 7.3   ALBUMIN 3.9   GLOBULIN 3.4   ALT (SGPT) 19   AST (SGOT) 18   BILIRUBIN 0.8   ALK PHOS 120*   LIPASE 47         Lab 10/28/24  2005 10/28/24  1812   HSTROP T 9 8         Lab 10/28/24  2005   CHOLESTEROL 191   LDL CHOL 74   HDL CHOL 41   TRIGLYCERIDES 484*             Lab 10/28/24  1906   FIO2 21     Brief Urine Lab Results  (Last result in the past 365 days)        Color   Clarity   Blood   Leuk Est   Nitrite   Protein   CREAT   Urine HCG        10/28/24 1812 Yellow   Clear   Negative   Negative   Negative   Negative                 Microbiology Results (last 10 days)       ** No results found for the last 240 hours. **            XR Chest 1 View    Result Date: 10/28/2024  Impression: Impression: No acute cardiopulmonary findings. Electronically Signed: Jaden Rooney MD  10/28/2024 6:45 PM EDT  Workstation ID: [id number]                 Labs Pending at Discharge:  Pending Labs       Order Current Status    Creatinine Urine Random (kidney function) GFR component - Urine, Clean Catch In process            Procedures Performed           Consults:   Consults       Date and Time Order Name Status Description    10/28/2024  7:17 PM Hospitalist (on-call MD unless specified)                Discharge Details        Discharge Medications        New Medications        Instructions Start Date   insulin glargine 100 UNIT/ML injection  Commonly known as: LANTUS, SEMGLEE   15 Units, Subcutaneous, Nightly      insulin lispro 100 UNIT/ML injection  Commonly known as: HUMALOG/ADMELOG   2-9 Units, Subcutaneous, 4 Times Daily Before Meals & Nightly      metFORMIN 500 MG tablet  Commonly known as: GLUCOPHAGE   500 mg, Oral, 2 Times Daily With Meals              Continue These Medications        Instructions Start Date   coenzyme Q10 100 MG capsule   100 mg, Daily      multivitamin with minerals tablet tablet   1 tablet, Daily               No Known Allergies      Discharge Disposition: Home  Home or Self Care    Diet:  Hospital:  Diet Order   Procedures    Diet: Diabetic; Consistent Carbohydrate; Fluid Consistency: Thin (IDDSI 0)         Discharge Activity:         CODE STATUS:  Code Status and Medical Interventions: CPR (Attempt to Resuscitate); Full Support   Ordered at: 10/28/24 2039     Level Of Support Discussed With:    Patient     Code Status (Patient has no pulse and is not breathing):    CPR (Attempt to Resuscitate)     Medical Interventions (Patient has pulse or is breathing):    Full Support         No future appointments.        Time spent on Discharge including face to face service:  > 30 minutes    Signature: Electronically signed by Monroe Holder MD, 10/29/24, 10:59 EDT.  [facility] [place] Hospitalist Team

## 2024-10-29 NOTE — CASE MANAGEMENT/SOCIAL WORK
Discharge Planning Assessment   Josef     Patient Name: Jaret Andrade  MRN: [id number]  Today's Date: 10/29/2024    Admit Date: 10/28/2024    Plan: Anticipate routine home alone.   Discharge Needs Assessment       Row Name 10/29/24 1517       Living Environment    People in Home alone    Current Living Arrangements home    Potentially Unsafe Housing Conditions none    In the past 12 months has the electric, gas, oil, or water company threatened to shut off services in your home? No    Primary Care Provided by self    Provides Primary Care For no one    Family Caregiver if Needed none  Patient reported that his emergency contact was himself and then God    Able to Return to Prior Arrangements yes       Resource/Environmental Concerns    Resource/Environmental Concerns none    Transportation Concerns none       Transportation Needs    In the past 12 months, has lack of transportation kept you from medical appointments or from getting medications? no    In the past 12 months, has lack of transportation kept you from meetings, work, or from getting things needed for daily living? No       Food Insecurity    Within the past 12 months, you worried that your food would run out before you got the money to buy more. Never true    Within the past 12 months, the food you bought just didn't last and you didn't have money to get more. Never true       Transition Planning    Patient/Family Anticipates Transition to home    Patient/Family Anticipated Services at Transition none    Transportation Anticipated car, drives self       Discharge Needs Assessment    Readmission Within the Last 30 Days no previous admission in last 30 days    Equipment Currently Used at Home none    Concerns to be Addressed financial/insurance    Do you want help finding or keeping work or a job? I do not need or want help    Anticipated Changes Related to Illness none    Equipment Needed After Discharge none    Provided Post Acute Provider List? N/A                 Discharge Plan       Row Name 10/29/24 1521       Plan    Plan Anticipate routine home alone.    Patient/Family in Agreement with Plan yes    Plan Comments CM and LSW met with patient at bedside to discuss dc planning. Patient screened by [employer] and is over-income for Medicaid at this time. Patient reported that he lives alone, drives himself, does not use any DME, and works full-time at [employer]. Reported that he has only been employed there for the past two months and has not been eligible for insurance through employer yet. Reported that he needs to discuss with his supervisor but reported that he must work at least 480 hours prior to becoming eligible for the insurance. 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 Reported that he plans to drive himself home at d/c. Reported that he was still processing the news that he is diabetic. Diabetic educator present at bedside to discuss with patient. Patient reported that he has not needed to take any medication before and reported that he is not current with PCP. LSW discussed options and patient agreeable to information printing on AVS at d/c. CM added [email or web address] address and phone number for patient to contact for PCP arrangements.              Demographic Summary       Row Name 10/29/24 1516       General Information    Admission Type observation    Arrived From emergency department    Referral Source admission list    Reason for Consult discharge planning    Preferred Language English       Contact Information    Permission Granted to Share Info With                    Functional Status       Row Name 10/29/24 1511       Functional Status    Usual Activity Tolerance good    Current Activity Tolerance good       Functional Status, IADL    Medications independent    Meal Preparation independent    Housekeeping independent    Laundry independent    Shopping independent        Employment/    Employment Status employed full-time           Sofya Nova RN     Office Phone: [phone]  Office Cell: [phone]

## 2024-10-29 NOTE — CONSULTS
"Diabetes Education  Assessment/Teaching    Patient Name:  Jaret Andrade  YOB: 1960  MRN: [id number]  Admit Date:  10/28/2024      Assessment Date:  10/29/2024  Flowsheet Row Most Recent Value   General Information     Referral From: MD order  [Consult received due to adm with HHS. Adm bs 785. A1c this adm 19.3%.]   Height 190.5 cm (75\")   Height Method Stated   Weight 76.3 kg (168 lb 3.4 oz)   Weight Method Bed scale   Pregnancy Assessment    Diabetes History    What type of diabetes do you have? Type 2   Length of Diabetes Diagnosis --  [Pt states newly diagnosed. Pt did go to  in 3/2023 and was told he had diabetes at that time. Pt was never treated for the diabetes.]   Education Preferences    Nutrition Information    Assessment Topics    DM Goals             Flowsheet Row Most Recent Value   DM Education Needs    Meter Meter provided  [Instructed pt on use of Contour Next EZ meter. He performed return demo correctly. Pt is self pay. Discussed pt will need to purchase ReliOn Premier meter, test strips and lancets at [facility] and reviewed cost. Gave handout.]   Meter Type Contour   Frequency of Testing 3 times a day  [Discussed importance of checking bs 2-3 times/day and recording readings. Gave log book. Discussed importance of follow up with PCP. Pt currently does not have PCP.]   Blood Glucose Target Range Discussed A1c result of 19.3%. Discussed healthy bs range and healthy A1c target. Discussed importance of bs control.   Medication Insulin, Pen  [Pt being discharged on insulin therapy. He has never taken diabetes meds.]   Problem Solving Hypoglycemia, Hyperglycemia, Signs, Symptoms, Treatment  [Discussed importance of always keeping low bs tx available.]   Reducing Risks A1C testing  [Gave A1c info sheet.]   Healthy Eating --  [Pt usually eating 3 meals/day.]   Physical Activity --  [Pt is active. He likes to ride bikes outdoors. Pt does weight training exercises. He states he plans to start " working out at gym. Discussed importance of weight-training and cardiovascular activity.]   Motivation Strong   Teaching Method Explanation, Discussion, Demonstration, Handouts   Patient Response Verbalized understanding, Demonstrates adequately              Other Comments:  Met with pt at bedside. Discussed diabetes disease process. Reviewed signs/symptoms of diabetes and appropriate treatment. Discussed meal plan and exercise will be pt's primary treatment for diabetes and medication is a secondary treatment.     Instructed pt in use of insulin pen. Pt performed return demo correctly and injected into foam pad correctly. Pt states he is not nervous about giving himself injections and he states does not need to practice injections prior to discharge. Discussed injection sites, rotation of sites, storage of insulin and disposal of pen needles.     MD sent in rxs for Lantus and moderate sliding scale with Lispro. Pt is self pay. Pt needing to use [facility] ReliOn Novolin 70/30. Discussed this type of insulin with pt. Discussed taking injection bid (prebreakfast and presupper). Pt states he will be able to purchase these pens from [facility]. Educator sent secure chat to MD to request sending rxs for ReliOn 70/30 pens and pen needles to [facility] in [place]. MD said she would send in rxs.     Pt agreeable to taking insulin and checking bs 2-3 times/day. Pt states he does not plan to stay on insulin long-term. Pt states wanting to try more natural methods/supplements for bs control. Discussed importance of bs control and using meds if not being able to keep bs and A1c result in healthy range. Discussed importance of follow up with PCP.     Discussed food groups containing CHO. Reviewed example serving sizes from these groups. Discussed trying to stay within 4 servings of CHO at each meal. Discussed low-fat meal prep.     Gave First Steps booklet, Planning Healthy Meals packet, Low bs handout, Insulin Pen  Instruction sheet and gave sample pen needles. Pt understands that he was given the Contour meter to have meter to use until he purchases the ReliOn meter from [employer]. Reviewed pricing of all of [employer] products and gave handouts. Pt states he does not have additional questions at this time.       Electronically signed by:  Concepcion Horton RN  10/29/24 15:25 EDT

## 2024-10-29 NOTE — H&P
"[facility] Medicine Services  History & Physical    Patient Name: Jaret Andrade  : 1960  MRN: [id number]  Primary Care Physician:  Provider, No Known  Date of admission: 10/28/2024  Date and Time of Service: 10/28/2024 at 2000    Subjective      Chief Complaint: weakness, dizziness    History of Present Illness: Jaret Andrade is a 63 y.o. male with a CMH of untreated DM type 2 who presented to [facility] on 10/28/2024 with weakness, dizziness. Lives at home, independent with ADLs. Denies hx of DM but medical records indicate was seen in  in 2023 diagnosing with DM and encouraged to follow up with PCP or endocrinology.   Presented to  today for intermittent weakness, dizziness for past week. Endorses \"some weight loss.\" Glucose level at  \"high\" was referred to ED for evaluation. Denies N/V/D, fever, chills, recent illness. Endorses polydipsia and polyuria. On arrival to ED VS; 98.1-617-/81-97% room air. Blood work reveals glucose 785, initiated on insulin infusion.     Upon evaluation, awake, alert, resting in bed. Appears anxious. Current VS: 95-/89-95% room air. Bedside heart monitoring reveals SR no ectopy. Reports family hx of DM. Endorses started working 2nd shift job recently and meal times altered. On exam with dry oral mucosa. Denies lightheadedness.   EKG reveals SR, right atrial enlargement, Rate 95, . Qtc 434  CXR negative  Blood work reveals WBC 5.87, Hgb 13.9, Hct 40.8, platelet 196, glucose 785, BUN 31, creatinine 1.55, alk phos 120, GFR 50, Agap 11.3, magnesium 1.9, acetone negative, A1c 19.3%,   Venous pH 7.391, pCO2  45, HCO3  27.3,   Urinalysis 500 glucose.       Review of Systems   Constitutional:  Positive for fatigue. Negative for fever.   Respiratory:  Negative for cough.         ORTEGA   Cardiovascular:  Negative for chest pain.   Gastrointestinal:  Negative for abdominal pain, diarrhea, nausea and vomiting.   Endocrine: Positive for " polydipsia and polyuria.   Genitourinary:  Negative for dysuria.   Neurological:         Intermittent dizziness       Personal History     Past Medical History:   Diagnosis Date    Acute cough 10/28/2024    Weight loss 10/28/2024       No past surgical history on file.    Family History: family history is not on file. Otherwise pertinent FHx was reviewed and not pertinent to current issue.    Social History:  reports that he has never smoked. He has never used smokeless tobacco. He reports current alcohol use. He reports that he does not use drugs.    Home Medications:  Prior to Admission Medications       Prescriptions Last Dose Informant Patient Reported? Taking?    coenzyme Q10 100 MG capsule   Yes Yes    Take 1 capsule by mouth Daily.    multivitamin with minerals tablet tablet   Yes Yes    Take 1 tablet by mouth Daily.              Allergies:  No Known Allergies    Objective      Vitals:   Temp:  [96.9 °F (36.1 °C)-98 °F (36.7 °C)] 98 °F (36.7 °C)  Heart Rate:  [] 105  Resp:  [17-19] 19  BP: (116-133)/(80-97) 119/97  Body mass index is 23.12 kg/m².  Physical Exam  Constitutional:       Appearance: Normal appearance.   HENT:      Head: Normocephalic and atraumatic.      Mouth/Throat:      Mouth: Mucous membranes are dry.   Eyes:      General: No scleral icterus.     Extraocular Movements: Extraocular movements intact.      Pupils: Pupils are equal, round, and reactive to light.   Cardiovascular:      Rate and Rhythm: Normal rate and regular rhythm.      Pulses: Normal pulses.      Heart sounds: Normal heart sounds.   Pulmonary:      Effort: Pulmonary effort is normal.      Breath sounds: Normal breath sounds.   Abdominal:      General: Bowel sounds are normal. There is no distension.      Palpations: Abdomen is soft.      Tenderness: There is no abdominal tenderness.   Musculoskeletal:      Right lower leg: No edema.      Left lower leg: No edema.   Skin:     General: Skin is warm and dry.   Neurological:       Mental Status: He is alert and oriented to person, place, and time. Mental status is at baseline.      Motor: Weakness present.         Diagnostic Data:  Lab Results (last 24 hours)       Procedure Component Value Units Date/Time    Magnesium [[id number]]  (Normal) Collected: 10/28/24 2005    Specimen: Blood Updated: 10/28/24 2030     Magnesium 2.1 mg/dL     Phosphorus [[id number]]  (Normal) Collected: 10/28/24 2005    Specimen: Blood Updated: 10/28/24 2027     Phosphorus 3.7 mg/dL     High Sensitivity Troponin T 2Hr [[id number]]  (Normal) Collected: 10/28/24 2005    Specimen: Blood Updated: 10/28/24 2027     HS Troponin T 9 ng/L      Troponin T Delta 1 ng/L     Narrative:      High Sensitive Troponin T Reference Range:  <14.0 ng/L- Negative Female for AMI  <22.0 ng/L- Negative Male for AMI  >=14 - Abnormal Female indicating possible myocardial injury.  >=22 - Abnormal Male indicating possible myocardial injury.   Clinicians would have to utilize clinical acumen, EKG, Troponin, and serial changes to determine if it is an Acute Myocardial Infarction or myocardial injury due to an underlying chronic condition.         Osmolality, Serum [[id number]]  (Abnormal) Collected: 10/28/24 2005    Specimen: Blood Updated: 10/28/24 2020     Osmolality 315 mOsm/kg     Hemoglobin A1c [[id number]]  (Abnormal) Collected: 10/28/24 1812    Specimen: Blood Updated: 10/28/24 2012     Hemoglobin A1C 19.30 %     CBC & Differential [[id number]]  (Abnormal) Collected: 10/28/24 2005    Specimen: Blood Updated: 10/28/24 2006    Narrative:      The following orders were created for panel order CBC & Differential.  Procedure                               Abnormality         Status                     ---------                               -----------         ------                     CBC Auto Differential[[id number]]        Abnormal            Final result                 Please view results for these tests on the individual orders.    CBC Auto  Differential [[id number]]  (Abnormal) Collected: 10/28/24 2005    Specimen: Blood Updated: 10/28/24 2006     WBC 6.01 10*3/mm3      RBC 4.66 10*6/mm3      Hemoglobin 14.1 g/dL      Hematocrit 40.9 %      MCV 87.8 fL      MCH 30.3 pg      MCHC 34.5 g/dL      RDW 12.2 %      RDW-SD 39.3 fl      MPV 10.8 fL      Platelets 197 10*3/mm3      Neutrophil % 69.4 %      Lymphocyte % 20.8 %      Monocyte % 7.5 %      Eosinophil % 1.5 %      Basophil % 0.5 %      Immature Grans % 0.3 %      Neutrophils, Absolute 4.17 10*3/mm3      Lymphocytes, Absolute 1.25 10*3/mm3      Monocytes, Absolute 0.45 10*3/mm3      Eosinophils, Absolute 0.09 10*3/mm3      Basophils, Absolute 0.03 10*3/mm3      Immature Grans, Absolute 0.02 10*3/mm3      nRBC 0.0 /100 WBC     Basic Metabolic Panel [[id number]] Collected: 10/28/24 2005    Specimen: Blood Updated: 10/28/24 2005    Phosphorus [[id number]]  (Normal) Collected: 10/28/24 1812    Specimen: Blood Updated: 10/28/24 1935     Phosphorus 4.0 mg/dL     High Sensitivity Troponin T [[id number]]  (Normal) Collected: 10/28/24 1812    Specimen: Blood Updated: 10/28/24 1935     HS Troponin T 8 ng/L     Narrative:      High Sensitive Troponin T Reference Range:  <14.0 ng/L- Negative Female for AMI  <22.0 ng/L- Negative Male for AMI  >=14 - Abnormal Female indicating possible myocardial injury.  >=22 - Abnormal Male indicating possible myocardial injury.   Clinicians would have to utilize clinical acumen, EKG, Troponin, and serial changes to determine if it is an Acute Myocardial Infarction or myocardial injury due to an underlying chronic condition.         Blood Gas, Venous - [[id number]]  (Abnormal) Collected: 10/28/24 1906    Specimen: Venous Blood Updated: 10/28/24 1909     Site Left Radial     pH, Venous 7.391 pH Units      pCO2, Venous 45.0 mm Hg      pO2, Venous 42.0 mm Hg      HCO3, Venous 27.3 mmol/L      Base Excess, Venous 1.7 mmol/L      Comment: Serial Number: [id number]:  [id number]        O2  Saturation, Venous 76.6 %      CO2 Content 28.7 mmol/L      Barometric Pressure for Blood Gas --     Comment: N/A        Modality Room Air     FIO2 21 %     Comprehensive Metabolic Panel [[id number]]  (Abnormal) Collected: 10/28/24 1812    Specimen: Blood Updated: 10/28/24 1900     Glucose 785 mg/dL      BUN 31 mg/dL      Creatinine 1.55 mg/dL      Sodium 122 mmol/L      Potassium 5.1 mmol/L      Chloride 86 mmol/L      CO2 24.7 mmol/L      Calcium 9.5 mg/dL      Total Protein 7.3 g/dL      Albumin 3.9 g/dL      ALT (SGPT) 19 U/L      AST (SGOT) 18 U/L      Alkaline Phosphatase 120 U/L      Total Bilirubin 0.8 mg/dL      Globulin 3.4 gm/dL      A/G Ratio 1.1 g/dL      BUN/Creatinine Ratio 20.0     Anion Gap 11.3 mmol/L      eGFR 50.0 mL/min/1.73     Narrative:      GFR Normal >60  Chronic Kidney Disease <60  Kidney Failure <15      Magnesium [[id number]]  (Normal) Collected: 10/28/24 1812    Specimen: Blood Updated: 10/28/24 1854     Magnesium 1.9 mg/dL     Lipase [[id number]]  (Normal) Collected: 10/28/24 1812    Specimen: Blood Updated: 10/28/24 1845     Lipase 47 U/L     TSH [[id number]]  (Normal) Collected: 10/28/24 1812    Specimen: Blood Updated: 10/28/24 1845     TSH 1.790 uIU/mL     Ketone Bodies, Serum (Not performed at [facility]) [[id number]]  (Normal) Collected: 10/28/24 1812    Specimen: Blood Updated: 10/28/24 1828    Narrative:      The following orders were created for panel order Ketone Bodies, Serum (Not performed at [facility]).  Procedure                               Abnormality         Status                     ---------                               -----------         ------                     Acetone[[id number]]                      Normal              Final result                 Please view results for these tests on the individual orders.    Acetone [[id number]]  (Normal) Collected: 10/28/24 1812    Specimen: Blood Updated: 10/28/24 1828     Acetone Negative    Urinalysis With Microscopic If  Indicated (No Culture) - Urine, Clean Catch [[id number]]  (Abnormal) Collected: 10/28/24 1812    Specimen: Urine, Clean Catch Updated: 10/28/24 1820     Color, UA Yellow     Appearance, UA Clear     pH, UA 5.5     Specific Gravity, UA 1.029     Glucose,  mg/dL (2+)     Ketones, UA Negative     Bilirubin, UA Negative     Blood, UA Negative     Protein, UA Negative     Leuk Esterase, UA Negative     Nitrite, UA Negative     Urobilinogen, UA 0.2 E.U./dL    Narrative:      Urine microscopic not indicated.    CBC & Differential [[id number]]  (Abnormal) Collected: 10/28/24 1812    Specimen: Blood Updated: 10/28/24 1818    Narrative:      The following orders were created for panel order CBC & Differential.  Procedure                               Abnormality         Status                     ---------                               -----------         ------                     CBC Auto Differential[[id number]]        Abnormal            Final result                 Please view results for these tests on the individual orders.    CBC Auto Differential [[id number]]  (Abnormal) Collected: 10/28/24 1812    Specimen: Blood Updated: 10/28/24 1818     WBC 5.87 10*3/mm3      RBC 4.59 10*6/mm3      Hemoglobin 13.9 g/dL      Hematocrit 40.8 %      MCV 88.9 fL      MCH 30.3 pg      MCHC 34.1 g/dL      RDW 12.2 %      RDW-SD 39.7 fl      MPV 11.1 fL      Platelets 196 10*3/mm3      Neutrophil % 72.5 %      Lymphocyte % 19.1 %      Monocyte % 6.3 %      Eosinophil % 1.2 %      Basophil % 0.7 %      Immature Grans % 0.2 %      Neutrophils, Absolute 4.26 10*3/mm3      Lymphocytes, Absolute 1.12 10*3/mm3      Monocytes, Absolute 0.37 10*3/mm3      Eosinophils, Absolute 0.07 10*3/mm3      Basophils, Absolute 0.04 10*3/mm3      Immature Grans, Absolute 0.01 10*3/mm3      nRBC 0.0 /100 WBC              Imaging Results (Last 24 Hours)       Procedure Component Value Units Date/Time    XR Chest 1 View [[id number]] Collected: 10/28/24 8022      Updated: 10/28/24 1847    Narrative:      XR CHEST 1 VW    Date of Exam: 10/28/2024 6:05 PM EDT    Indication: soa    Comparison: Chest x-ray 3/23/2023    Findings:  Normal cardiomediastinal silhouette. The lungs are clear. No pleural effusion or pneumothorax. No acute osseous findings.      Impression:      Impression:  No acute cardiopulmonary findings.            Electronically Signed: Jaden Rooney MD    10/28/2024 6:45 PM EDT    Workstation ID: [id number]              Assessment & Plan        This is a 63 y.o. male with PMH of untreated DM type 2 who presented with intermittent weakness, dizziness for past week. ED work up revealed glucose 785. Was initiated on insulin infusion.         Active and Resolved Problems  Active Hospital Problems    Diagnosis  POA    **Hyperosmolar hyperglycemic state (HHS) [E11.00]  Yes      Resolved Hospital Problems   No resolved problems to display.     HHS:  Untreated diabetes mellitus type 2:  -presents with intermittent weakness, dizziness for past week. Endorses polyuria, polydipsia  -diagnosed with DM in 2023 with no follow up  -admission A1c 19.3%, glucose 785, acetone negative, Agap 11.3, pH 7.391  -s/p 2 L fluid bolus. Continue IVF per HHS protocol.   -insulin infusion.   -hourly glucose monitoring.   -telemetry.   -BMP every 4 hours.   -osmo and urine osmo pending  -diabetes educator consult.   -consider endocrinology consult pending clinical course.   -case management referral, no insurance, will need assistance with insulin on discharge.   -nutrition consult.     SAM versus chronic renal insufficiency:  -admission creatinine 1.55, GFR 50, no previous labs for comparison  -daily BMP to monitor renal function.   -strict I/Os, daily weight, avoid nephrotoxins.   -urine lytes pending                VTE Prophylaxis:  Pharmacologic VTE prophylaxis orders are present.        The patient desires to be as follows:    CODE STATUS:    Level Of Support Discussed With:  Patient  Code Status (Patient has no pulse and is not breathing): CPR (Attempt to Resuscitate)  Medical Interventions (Patient has pulse or is breathing): Full Support        Admission Status:  I believe this patient meets OBS status.    Expected Length of Stay: < 2 midnights    PDMP and Medication Dispenses via Sidebar reviewed and consistent with patient reported medications.    I discussed the patient's findings and my recommendations with patient and nursing staff.      Signature:     This document has been electronically signed by ZIGGY Solis on October 28, 2024 20:39 EDT   [facility] Hospitalist Team

## 2024-10-30 VITALS
WEIGHT: 175.04 LBS | BODY MASS INDEX: 21.76 KG/M2 | HEART RATE: 86 BPM | TEMPERATURE: 98.2 F | DIASTOLIC BLOOD PRESSURE: 61 MMHG | SYSTOLIC BLOOD PRESSURE: 102 MMHG | RESPIRATION RATE: 15 BRPM | HEIGHT: 75 IN | OXYGEN SATURATION: 95 %

## 2024-10-30 LAB
ANION GAP SERPL CALCULATED.3IONS-SCNC: 8.4 MMOL/L (ref 5–15)
BASOPHILS # BLD AUTO: 0.03 10*3/MM3 (ref 0–0.2)
BASOPHILS NFR BLD AUTO: 0.6 % (ref 0–1.5)
BUN SERPL-MCNC: 13 MG/DL (ref 8–23)
BUN/CREAT SERPL: 13.1 (ref 7–25)
CALCIUM SPEC-SCNC: 9.1 MG/DL (ref 8.6–10.5)
CHLORIDE SERPL-SCNC: 105 MMOL/L (ref 98–107)
CO2 SERPL-SCNC: 22.6 MMOL/L (ref 22–29)
CREAT SERPL-MCNC: 0.99 MG/DL (ref 0.76–1.27)
DEPRECATED RDW RBC AUTO: 39.8 FL (ref 37–54)
EGFRCR SERPLBLD CKD-EPI 2021: 85.6 ML/MIN/1.73
EOSINOPHIL # BLD AUTO: 0.15 10*3/MM3 (ref 0–0.4)
EOSINOPHIL NFR BLD AUTO: 3.2 % (ref 0.3–6.2)
ERYTHROCYTE [DISTWIDTH] IN BLOOD BY AUTOMATED COUNT: 12.3 % (ref 12.3–15.4)
GLUCOSE BLDC GLUCOMTR-MCNC: 150 MG/DL (ref 70–105)
GLUCOSE BLDC GLUCOMTR-MCNC: 331 MG/DL (ref 70–105)
GLUCOSE SERPL-MCNC: 153 MG/DL (ref 65–99)
HCT VFR BLD AUTO: 38.7 % (ref 37.5–51)
HGB BLD-MCNC: 12.7 G/DL (ref 13–17.7)
IMM GRANULOCYTES # BLD AUTO: 0.01 10*3/MM3 (ref 0–0.05)
IMM GRANULOCYTES NFR BLD AUTO: 0.2 % (ref 0–0.5)
LYMPHOCYTES # BLD AUTO: 1.85 10*3/MM3 (ref 0.7–3.1)
LYMPHOCYTES NFR BLD AUTO: 38.9 % (ref 19.6–45.3)
MAGNESIUM SERPL-MCNC: 2 MG/DL (ref 1.6–2.4)
MCH RBC QN AUTO: 28.9 PG (ref 26.6–33)
MCHC RBC AUTO-ENTMCNC: 32.8 G/DL (ref 31.5–35.7)
MCV RBC AUTO: 88.2 FL (ref 79–97)
MONOCYTES # BLD AUTO: 0.39 10*3/MM3 (ref 0.1–0.9)
MONOCYTES NFR BLD AUTO: 8.2 % (ref 5–12)
NEUTROPHILS NFR BLD AUTO: 2.32 10*3/MM3 (ref 1.7–7)
NEUTROPHILS NFR BLD AUTO: 48.9 % (ref 42.7–76)
NRBC BLD AUTO-RTO: 0 /100 WBC (ref 0–0.2)
PLATELET # BLD AUTO: 193 10*3/MM3 (ref 140–450)
PMV BLD AUTO: 10.9 FL (ref 6–12)
POTASSIUM SERPL-SCNC: 3.8 MMOL/L (ref 3.5–5.2)
RBC # BLD AUTO: 4.39 10*6/MM3 (ref 4.14–5.8)
SODIUM SERPL-SCNC: 136 MMOL/L (ref 136–145)
WBC NRBC COR # BLD AUTO: 4.75 10*3/MM3 (ref 3.4–10.8)

## 2024-10-30 PROCEDURE — 82948 REAGENT STRIP/BLOOD GLUCOSE: CPT

## 2024-10-30 PROCEDURE — 63710000001 INSULIN LISPRO (HUMAN) PER 5 UNITS: Performed by: NURSE PRACTITIONER

## 2024-10-30 PROCEDURE — 63710000001 INSULIN LISPRO (HUMAN) PER 5 UNITS

## 2024-10-30 PROCEDURE — 80048 BASIC METABOLIC PNL TOTAL CA: CPT | Performed by: NURSE PRACTITIONER

## 2024-10-30 PROCEDURE — 85025 COMPLETE CBC W/AUTO DIFF WBC: CPT | Performed by: NURSE PRACTITIONER

## 2024-10-30 PROCEDURE — G0378 HOSPITAL OBSERVATION PER HR: HCPCS

## 2024-10-30 PROCEDURE — 83735 ASSAY OF MAGNESIUM: CPT | Performed by: NURSE PRACTITIONER

## 2024-10-30 RX ADMIN — INSULIN LISPRO 5 UNITS: 100 INJECTION, SOLUTION INTRAVENOUS; SUBCUTANEOUS at 11:17

## 2024-10-30 RX ADMIN — PANTOPRAZOLE SODIUM 40 MG: 40 TABLET, DELAYED RELEASE ORAL at 05:14

## 2024-10-30 RX ADMIN — INSULIN LISPRO 7 UNITS: 100 INJECTION, SOLUTION INTRAVENOUS; SUBCUTANEOUS at 11:17

## 2024-10-30 NOTE — PLAN OF CARE
Goal Outcome Evaluation:                        Problem: Adult Inpatient Plan of Care  Goal: Plan of Care Review  10/30/2024 0755 by Delores Rossi RN  Outcome: Progressing  10/30/2024 0755 by Delores Rossi RN  Outcome: Progressing  Goal: Patient-Specific Goal (Individualized)  10/30/2024 0755 by Delores Rossi RN  Outcome: Progressing  10/30/2024 0755 by Delores Rossi RN  Outcome: Progressing  Goal: Absence of Hospital-Acquired Illness or Injury  10/30/2024 0755 by Delores Rossi RN  Outcome: Progressing  10/30/2024 0755 by Delores Rossi RN  Outcome: Progressing  Goal: Optimal Comfort and Wellbeing  10/30/2024 0755 by Delores Rossi RN  Outcome: Progressing  10/30/2024 0755 by Delores Rossi RN  Outcome: Progressing  Goal: Readiness for Transition of Care  10/30/2024 0755 by Delores Rossi RN  Outcome: Progressing  10/30/2024 0755 by Delores Rossi RN  Outcome: Progressing

## 2024-10-30 NOTE — CASE MANAGEMENT/SOCIAL WORK
Case Management Discharge Note      Final Note: Home    Provided Post Acute Provider List?: N/A      Transportation Services  Private: Car    Final Discharge Disposition Code: 01 - home or self-care

## 2024-10-30 NOTE — PROGRESS NOTES
.    [facility] MEDICINE SERVICE  DAILY PROGRESS NOTE    NAME: Jaret Andrade  : 1960  MRN: [id number]      LOS: 0 days     PROVIDER OF SERVICE: Monroe Holder MD    Chief Complaint: Hyperosmolar hyperglycemic state (HHS)    Subjective:     Interval History:  History taken from: patient    Seen and examined at bedside, resting comfortably.  Detailed discussion carried out about new diagnosis of diabetes and management.        Review of Systems:   Review of Systems negative except as mentioned above    Objective:     Vital Signs  Temp:  [97.3 °F (36.3 °C)-98.1 °F (36.7 °C)] 98.1 °F (36.7 °C)  Heart Rate:  [75-81] 75  Resp:  [16-21] 16  BP: (104-130)/(58-78) 104/65   Body mass index is 21.88 kg/m².    Physical Exam  Physical Exam  Constitutional:       Appearance: Normal appearance.   HENT:      Head: Normocephalic and atraumatic.      Mouth/Throat:      Mouth: Mucous membranes are dry.   Eyes:      General: No scleral icterus.     Extraocular Movements: Extraocular movements intact.      Pupils: Pupils are equal, round, and reactive to light.   Cardiovascular:      Rate and Rhythm: Normal rate and regular rhythm.      Pulses: Normal pulses.      Heart sounds: Normal heart sounds.   Pulmonary:      Effort: Pulmonary effort is normal.      Breath sounds: Normal breath sounds.   Abdominal:      General: Bowel sounds are normal. There is no distension.      Palpations: Abdomen is soft.      Tenderness: There is no abdominal tenderness.   Musculoskeletal:      Right lower leg: No edema.      Left lower leg: No edema.   Skin:     General: Skin is warm and dry.   Neurological:      Mental Status: He is alert and oriented to person, place, and time. Mental status is at baseline.      Motor: WNL    Diagnostic Data    Results from last 7 days   Lab Units 10/30/24  0516 10/28/24  2005 10/28/24  1812   WBC 10*3/mm3 4.75   < > 5.87   HEMOGLOBIN g/dL 12.7*   < > 13.9   HEMATOCRIT % 38.7   < > 40.8   PLATELETS 10*3/mm3  193   < > 196   GLUCOSE mg/dL 153*   < > 785*   CREATININE mg/dL 0.99   < > 1.55*   BUN mg/dL 13   < > 31*   SODIUM mmol/L 136   < > 122*   POTASSIUM mmol/L 3.8   < > 5.1   AST (SGOT) U/L  --   --  18   ALT (SGPT) U/L  --   --  19   ALK PHOS U/L  --   --  120*   BILIRUBIN mg/dL  --   --  0.8   ANION GAP mmol/L 8.4   < > 11.3    < > = values in this interval not displayed.       XR Chest 1 View    Result Date: 10/28/2024  Impression: No acute cardiopulmonary findings. Electronically Signed: Jaden Rooney MD  10/28/2024 6:45 PM EDT  Workstation ID: [id number]       I reviewed the patient's new clinical results.    Assessment/Plan:     Active and Resolved Problems  Active Hospital Problems   No active problems to display.      Resolved Hospital Problems    Diagnosis Date Resolved POA    **Hyperosmolar hyperglycemic state (HHS) [E11.00] 10/29/2024 Yes       HHS:  Untreated diabetes mellitus type 2:  -presents with intermittent weakness, dizziness for past week. Endorses polyuria, polydipsia  -diagnosed with DM in 2023 with no follow up  -admission A1c 19.3%, glucose 785, acetone negative, Agap 11.3, pH 7.391  -s/p 2 L fluid bolus. Continue IVF per HHS protocol.   -insulin infusion stopped and subcutaneous insulin initiated.   -hourly glucose monitoring.   -telemetry.   -BMP every 4 hours.   -osmo and urine osmo pending  -diabetes educator consult.   -case management referral, no insurance, will need assistance with insulin on discharge.      SAM versus chronic renal insufficiency:  -admission creatinine 1.55, GFR 50, no previous labs for comparison  -daily BMP to monitor renal function.   -strict I/Os, daily weight, avoid nephrotoxins.     VTE Prophylaxis:  Pharmacologic VTE prophylaxis orders are present.             Disposition Planning:     Barriers to Discharge: Ongoing care  Anticipated Date of Discharge: 10/30  Place of Discharge: Home      Time: > 30 minutes     Code Status and Medical Interventions: CPR (Attempt  to Resuscitate); Full Support   Ordered at: 10/28/24 2039     Level Of Support Discussed With:    Patient     Code Status (Patient has no pulse and is not breathing):    CPR (Attempt to Resuscitate)     Medical Interventions (Patient has pulse or is breathing):    Full Support       Signature: Electronically signed by Monroe Holder MD, 10/30/24, 08:17 EDT.  [facility] Hospitalist Team

## 2024-10-31 ENCOUNTER — TELEPHONE (OUTPATIENT)
Dept: DIABETES SERVICES | Facility: HOSPITAL | Age: 64
End: 2024-10-31

## 2024-11-04 ENCOUNTER — TELEPHONE (OUTPATIENT)
Dept: DIABETES SERVICES | Facility: HOSPITAL | Age: 64
End: 2024-11-04